# Patient Record
Sex: FEMALE | Race: WHITE
[De-identification: names, ages, dates, MRNs, and addresses within clinical notes are randomized per-mention and may not be internally consistent; named-entity substitution may affect disease eponyms.]

---

## 2018-04-06 ENCOUNTER — HOSPITAL ENCOUNTER (EMERGENCY)
Dept: HOSPITAL 17 - PHED | Age: 28
Discharge: HOME | End: 2018-04-06
Payer: COMMERCIAL

## 2018-04-06 VITALS
DIASTOLIC BLOOD PRESSURE: 70 MMHG | OXYGEN SATURATION: 99 % | SYSTOLIC BLOOD PRESSURE: 126 MMHG | RESPIRATION RATE: 16 BRPM | TEMPERATURE: 98.4 F | HEART RATE: 74 BPM

## 2018-04-06 VITALS
DIASTOLIC BLOOD PRESSURE: 74 MMHG | RESPIRATION RATE: 18 BRPM | OXYGEN SATURATION: 98 % | HEART RATE: 72 BPM | SYSTOLIC BLOOD PRESSURE: 126 MMHG

## 2018-04-06 VITALS — OXYGEN SATURATION: 96 %

## 2018-04-06 VITALS — HEIGHT: 62 IN | BODY MASS INDEX: 29.62 KG/M2 | WEIGHT: 160.94 LBS

## 2018-04-06 DIAGNOSIS — S43.014A: Primary | ICD-10-CM

## 2018-04-06 PROCEDURE — 73030 X-RAY EXAM OF SHOULDER: CPT

## 2018-04-06 PROCEDURE — 23650 CLTX SHO DSLC W/MNPJ WO ANES: CPT

## 2018-04-06 PROCEDURE — 96376 TX/PRO/DX INJ SAME DRUG ADON: CPT

## 2018-04-06 PROCEDURE — 99283 EMERGENCY DEPT VISIT LOW MDM: CPT

## 2018-04-06 PROCEDURE — 96375 TX/PRO/DX INJ NEW DRUG ADDON: CPT

## 2018-04-06 PROCEDURE — 96374 THER/PROPH/DIAG INJ IV PUSH: CPT

## 2018-04-06 NOTE — RADRPT
EXAM DATE/TIME:  04/06/2018 10:08 

 

[facility] COMPARISON:     

No previous studies available for comparison.

 

                     

INDICATIONS :     

Right shoulder dislocation w/ no recent trauma. Patient states she has recurring bouts of right shoul
britni dislocations

                     

 

MEDICAL HISTORY :     

None.          

 

SURGICAL HISTORY :     

None.   

 

ENCOUNTER:     

Initial                                        

 

ACUITY:     

1 day      

 

PAIN SCORE:     

10/10

 

LOCATION:     

Right  shoulder

 

FINDINGS:     

There is anterior/inferior rotation of the humerus with respect to the glenoid.  No fracture seen.  T
he a.c. joint is intact.  The visualized right upper ribs are intact.

 

CONCLUSION:     

Anterior shoulder dislocation.

 

 

 

 Raad Reed MD on April 06, 2018 at 11:11           

Board Certified Radiologist.

 This report was verified electronically.

## 2018-04-06 NOTE — RADRPT
EXAM DATE/TIME:  04/06/2018 11:14 

 

[facility] COMPARISON:     

SHOULDER RIGHT LTD (2VWS), April 06, 2018, 10:08.

 

                     

INDICATIONS :     

Post reduction right shoulder.

                     

 

MEDICAL HISTORY :     

None.          

 

SURGICAL HISTORY :     

None.   

 

ENCOUNTER:     

Subsequent                                        

 

ACUITY:     

1 day      

 

PAIN SCORE:     

0/10

 

LOCATION:     

Right  shoulder

 

FINDINGS:     

2 view examination of the right shoulder status post reduction demonstrates anatomic alignment of the
 humeral head and glenoid.  No fractures seen.

 

CONCLUSION:     

Status post reduction of right shoulder dislocation.

 

 

 

 Raad Reed MD on April 06, 2018 at 11:31           

Board Certified Radiologist.

 This report was verified electronically.

## 2018-04-06 NOTE — PD
HPI


Chief Complaint:  Musculoskeletal Complaint


Time Seen by Provider:  09:47


Travel History


International Travel<30 days:  No


Contact w/Intl Traveler<30days:  No


Traveled to known affect area:  No





History of Present Illness


HPI


This 27-year-old female is complaining of right shoulder pain.  She has had 

several dislocations in the past.  She believes is dislocated now.  She was 

putting her arm up when she had sudden onset of pain.  She has no other injury.

  She has had a tubal ligation.  She says the pain she is having now is quite 

severe and constant





PFSH


Past Medical History


Medical History:  Denies Significant Hx


Diminished Hearing:  No


Pregnant?:  Not Pregnant


LMP:  now


Menopausal:  No


:  2


Para:  1





Past Surgical History


Surgical History:  No Previous Surgery


Tonsillectomy:  Yes





Social History


Alcohol Use:  No


Tobacco Use:  No


Substance Use:  No





Allergies-Medications


(Allergen,Severity, Reaction):  


Coded Allergies:  


     No Known Allergies (Unverified  Adverse Reaction, Unknown, 18)


Reported Meds & Prescriptions





Reported Meds & Active Scripts


Active


No Active Prescriptions or Reported Medications    








Review of Systems


General / Constitutional:  No: Fever, Chills


HENT:  No: Headaches


Cardiovascular:  No: Chest Pain or Discomfort


Gastrointestinal:  No: Nausea, Vomiting


Skin:  No Rash


Neurologic:  No: Weakness


Endocrine:  No: Heat Intolerance


Hematologic/Lymphatic:  No: Easy Bruising





Physical Exam


Narrative


GENERAL: Well-developed female


SKIN: Focused skin assessment warm/dry.


HEAD: Atraumatic. Normocephalic. 


EYES: Pupils equal and round. No scleral icterus. No injection or drainage. 


ENT: No nasal bleeding or discharge.  Mucous membranes pink and moist.


NECK: Trachea midline. No JVD. 


CARDIOVASCULAR: Regular rate and rhythm.  No murmur appreciated.


RESPIRATORY: No accessory muscle use. Clear to auscultation. Breath sounds 

equal bilaterally. 


GASTROINTESTINAL: Abdomen soft, non-tender, nondistended. Hepatic and splenic 

margins not palpable. 


MUSCULOSKELETAL: There is a squared off deformity at the right shoulder.  Good 

pulse in the right arm sensation


NEUROLOGICAL: Awake and alert. No obvious cranial nerve deficits.  Motor 

grossly within normal limits. Normal speech.


PSYCHIATRIC: Appropriate mood and affect; insight and judgment normal.





Data


Data


Last Documented VS





Vital Signs








  Date Time  Temp Pulse Resp B/P (MAP) Pulse Ox O2 Delivery O2 Flow Rate FiO2


 


18 11:00     96   


 


18 11:00       2.00 


 


18 10:55   16     


 


18 09:42 98.4 74  126/70 (88)    








Orders





 Orders


Ondansetron Inj (Zofran Inj) (18 10:00)


Hydromorphone Pf Inj (Dilaudid Pf Inj) (18 10:00)


Propofol 200 Mg/20 Ml Inj (Diprivan  200 (18 10:00)


Shoulder, Limited(2vws) (18 09:47)


Shoulder, Limited(2vws) (18 11:03)








MDM


Medical Decision Making


Medical Screen Exam Complete:  Yes


Emergency Medical Condition:  Yes


Medical Record Reviewed:  Yes


Differential Diagnosis


Differential includes dislocation right shoulder


Narrative Course


X-ray shows inferior dislocation of the shoulder.  Conscious sedation was 

discussed with the patient.  She was given 60 mg of intravenous propofol and 

after the sedation her shoulder has reduced.  Repeat x-ray shows good position 

of the shoulder





Procedures


**Procedure Narrative**


Informed consent for conscious sedation was obtained.  Under cardiopulmonary 

monitoring the patient was given 60 mg of intravenous propofol and the shoulder 

has reduced with minimal manipulation





Diagnosis





 Primary Impression:  


 Dislocation of right shoulder joint





***Additional Instructions:  


Use sling for 2-3 days, follow-up with orthopedist


Scripts


No Active Prescriptions or Reported Meds


Disposition:  01 DISCHARGE HOME


Condition:  Stable











Armond Gipson MD 2018 12:08